# Patient Record
Sex: FEMALE | Race: WHITE | Employment: UNEMPLOYED | URBAN - METROPOLITAN AREA
[De-identification: names, ages, dates, MRNs, and addresses within clinical notes are randomized per-mention and may not be internally consistent; named-entity substitution may affect disease eponyms.]

---

## 2017-08-23 ENCOUNTER — OFFICE VISIT (OUTPATIENT)
Dept: FAMILY MEDICINE CLINIC | Facility: CLINIC | Age: 42
End: 2017-08-23

## 2017-08-23 DIAGNOSIS — Z23 NEED FOR DIPHTHERIA-TETANUS-PERTUSSIS (TDAP) VACCINE: Primary | ICD-10-CM

## 2017-08-23 PROCEDURE — 90715 TDAP VACCINE 7 YRS/> IM: CPT | Performed by: NURSE PRACTITIONER

## 2017-08-23 PROCEDURE — 90471 IMMUNIZATION ADMIN: CPT | Performed by: NURSE PRACTITIONER

## 2017-12-05 PROBLEM — F41.9 ANXIETY: Status: ACTIVE | Noted: 2017-12-05

## 2018-06-27 PROCEDURE — 87624 HPV HI-RISK TYP POOLED RSLT: CPT | Performed by: OBSTETRICS & GYNECOLOGY

## 2018-06-27 PROCEDURE — 88175 CYTOPATH C/V AUTO FLUID REDO: CPT | Performed by: OBSTETRICS & GYNECOLOGY

## 2019-08-20 PROCEDURE — 86480 TB TEST CELL IMMUN MEASURE: CPT | Performed by: NURSE PRACTITIONER

## 2024-10-11 ENCOUNTER — HOSPITAL ENCOUNTER (EMERGENCY)
Age: 49
Discharge: HOME | End: 2024-10-11
Payer: COMMERCIAL

## 2024-10-11 ENCOUNTER — APPOINTMENT (OUTPATIENT)
Dept: GENERAL RADIOLOGY | Age: 49
End: 2024-10-11
Attending: PHYSICIAN ASSISTANT
Payer: COMMERCIAL

## 2024-10-11 VITALS
HEART RATE: 79 BPM | RESPIRATION RATE: 14 BRPM | BODY MASS INDEX: 25.92 KG/M2 | WEIGHT: 175 LBS | HEIGHT: 69 IN | TEMPERATURE: 98 F | DIASTOLIC BLOOD PRESSURE: 80 MMHG | OXYGEN SATURATION: 98 % | SYSTOLIC BLOOD PRESSURE: 129 MMHG

## 2024-10-11 DIAGNOSIS — S62.665A CLOSED NONDISPLACED FRACTURE OF DISTAL PHALANX OF LEFT RING FINGER, INITIAL ENCOUNTER: Primary | ICD-10-CM
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
## 2024-10-11 DIAGNOSIS — S60.449A CONSTRICTIVE JEWELRY OF FINGER, INITIAL ENCOUNTER: ICD-10-CM

## 2024-10-11 PROCEDURE — 73140 X-RAY EXAM OF FINGER(S): CPT | Performed by: PHYSICIAN ASSISTANT

## 2024-10-11 PROCEDURE — 26750 TREAT FINGER FRACTURE EACH: CPT

## 2024-10-11 PROCEDURE — 99284 EMERGENCY DEPT VISIT MOD MDM: CPT

## 2024-10-11 PROCEDURE — 99283 EMERGENCY DEPT VISIT LOW MDM: CPT

## 2024-10-11 NOTE — ED INITIAL ASSESSMENT (HPI)
Injured left ring finger playing softball yesterday. Pain initially to nail, now pain swelling bruising to whole finger. Rings stuck on finger.

## 2024-10-12 NOTE — ED PROVIDER NOTES
Patient Seen in: [facility] In [place]      History     Chief Complaint   Patient presents with    Arm or Hand Injury     Stated Complaint: left 4th digit injury last night    Subjective:   HPI      49-year-old female presents to the ER with pain to left fourth finger.  Patient states a softball hit her nail yesterday.  Woke up today with worsening pain and swelling.    Objective:     Past Medical History:    Anemia    with pregnancy    H/O pregnancy        IUD (intrauterine device) in place    mirena    Pap smear for cervical cancer screening    wnl/neg hpv              Past Surgical History:   Procedure Laterality Date          x3 , no high blood pressure or diabetes.                 Social History     Socioeconomic History    Marital status:    Tobacco Use    Smoking status: Never    Smokeless tobacco: Never   Vaping Use    Vaping status: Never Used   Substance and Sexual Activity    Alcohol use: Yes     Comment: rarely    Drug use: No    Sexual activity: Yes     Partners: Male     Birth control/protection: I.U.D.   Other Topics Concern    Caffeine Concern Yes     Comment: 1 coffee a day    Exercise Yes     Comment: running                  Physical Exam     ED Triage Vitals [10/11/24 1702]   /80   Pulse 87   Resp 16   Temp 98.2 °F (36.8 °C)   Temp src Temporal   SpO2 99 %   O2 Device None (Room air)       Current Vitals:   Vital Signs  BP: 129/80  Pulse: 79  Resp: 14  Temp: 98.2 °F (36.8 °C)  Temp src: Temporal    Oxygen Therapy  SpO2: 98 %  O2 Device: None (Room air)        Physical Exam  Vitals and nursing note reviewed.   Constitutional:       Appearance: She is well-developed.   HENT:      Head: Atraumatic.      Right Ear: External ear normal.      Left Ear: External ear normal.   Eyes:      Conjunctiva/sclera: Conjunctivae normal.   Cardiovascular:      Rate and Rhythm: Normal rate.   Pulmonary:      Effort: Pulmonary effort is normal.   Musculoskeletal:       Cervical back: Normal range of motion and neck supple.      Comments: Entire left 4th finger is contused and swollen, tender to touch, slight limit on ROM. Dry blood around nails.   Skin:     General: Skin is warm and dry.      Capillary Refill: Capillary refill takes less than 2 seconds.   Neurological:      Mental Status: She is alert and oriented to person, place, and time.   Psychiatric:         Mood and Affect: Mood normal.             ED Course   Labs Reviewed - No data to display         XR FINGER(S) (MIN 2 VIEWS), LEFT 4TH (CPT=73140)    Result Date: 10/11/2024  PROCEDURE:  XR FINGER(S) (MIN 2 VIEWS), LEFT 4TH (CPT=73140)  INDICATIONS:  left 4th digit injury last night, finger pain  COMPARISON:  None.  TECHNIQUE:  Three views of the finger were obtained.  PATIENT STATED HISTORY: (As transcribed by Technologist)  Pt jammed her left 4h finger playing softball yesterday.  Swelling and bruising to the entire finger, pain to the distal phalanx.             CONCLUSION:  There is an acute transverse nondisplaced fracture involving the proximal diaphysis of the left 4th distal phalanx with soft tissue swelling.  Normal joint spaces. There are multiple punctate radiopaque foreign bodies projected over the digits especially the 3rd.   LOCATION:  [facility]   Dictated by (CST): Andrew Arthur MD on 10/11/2024 at 7:51 PM     Finalized by (CST): Andrew Arthur MD on 10/11/2024 at 7:51 PM             MDM      49-year-old female presents to the ER with left ring finger pain.  Ring was on initial exam and is causing significant swelling to the entire finger.    Differential diagnosis includes but not limited to:  Fracture, dislocation, contusion    X-ray is consistent with a distal phalanx fracture.  I have reviewed the x-ray images independently.    Ring removed by PCT.  Finger splint applied.  Will refer to Ortho for follow-up.  All questions answered.          Medical Decision Making      Disposition and Plan      Clinical Impression:  1. Closed nondisplaced fracture of distal phalanx of left ring finger, initial encounter    2. Constrictive jewelry of finger, initial encounter         Disposition:  Discharge  10/11/2024  7:58 pm    Follow-up:  Anand Coto MD  [address]  [address] [address]  [phone]    Follow up            Medications Prescribed:  Discharge Medication List as of 10/11/2024  8:04 PM              Supplementary Documentation: